# Patient Record
Sex: MALE | Race: OTHER | NOT HISPANIC OR LATINO
[De-identification: names, ages, dates, MRNs, and addresses within clinical notes are randomized per-mention and may not be internally consistent; named-entity substitution may affect disease eponyms.]

---

## 2017-12-01 ENCOUNTER — TRANSCRIPTION ENCOUNTER (OUTPATIENT)
Age: 42
End: 2017-12-01

## 2018-02-05 ENCOUNTER — TRANSCRIPTION ENCOUNTER (OUTPATIENT)
Age: 43
End: 2018-02-05

## 2018-02-13 ENCOUNTER — INPATIENT (INPATIENT)
Facility: HOSPITAL | Age: 43
LOS: 0 days | Discharge: HOME | DRG: 309 | End: 2018-02-14
Attending: INTERNAL MEDICINE | Admitting: INTERNAL MEDICINE
Payer: COMMERCIAL

## 2018-02-13 VITALS
RESPIRATION RATE: 15 BRPM | OXYGEN SATURATION: 100 % | SYSTOLIC BLOOD PRESSURE: 140 MMHG | HEART RATE: 180 BPM | DIASTOLIC BLOOD PRESSURE: 80 MMHG

## 2018-02-13 DIAGNOSIS — I48.91 UNSPECIFIED ATRIAL FIBRILLATION: ICD-10-CM

## 2018-02-13 LAB
ALBUMIN SERPL ELPH-MCNC: 4.4 G/DL — SIGNIFICANT CHANGE UP (ref 3.3–5)
ALP SERPL-CCNC: 62 U/L — SIGNIFICANT CHANGE UP (ref 40–120)
ALT FLD-CCNC: 38 U/L RC — SIGNIFICANT CHANGE UP (ref 10–45)
ANION GAP SERPL CALC-SCNC: 17 MMOL/L — SIGNIFICANT CHANGE UP (ref 5–17)
APTT BLD: 35.5 SEC — SIGNIFICANT CHANGE UP (ref 27.5–37.4)
AST SERPL-CCNC: 35 U/L — SIGNIFICANT CHANGE UP (ref 10–40)
BASE EXCESS BLDV CALC-SCNC: 0.5 MMOL/L — SIGNIFICANT CHANGE UP (ref -2–2)
BASOPHILS # BLD AUTO: 0.1 K/UL — SIGNIFICANT CHANGE UP (ref 0–0.2)
BASOPHILS NFR BLD AUTO: 0.7 % — SIGNIFICANT CHANGE UP (ref 0–2)
BILIRUB SERPL-MCNC: 1.3 MG/DL — HIGH (ref 0.2–1.2)
BUN SERPL-MCNC: 16 MG/DL — SIGNIFICANT CHANGE UP (ref 7–23)
CA-I SERPL-SCNC: 1.19 MMOL/L — SIGNIFICANT CHANGE UP (ref 1.12–1.3)
CALCIUM SERPL-MCNC: 9.2 MG/DL — SIGNIFICANT CHANGE UP (ref 8.4–10.5)
CHLORIDE BLDV-SCNC: 104 MMOL/L — SIGNIFICANT CHANGE UP (ref 96–108)
CHLORIDE SERPL-SCNC: 101 MMOL/L — SIGNIFICANT CHANGE UP (ref 96–108)
CO2 BLDV-SCNC: 28 MMOL/L — SIGNIFICANT CHANGE UP (ref 22–30)
CO2 SERPL-SCNC: 22 MMOL/L — SIGNIFICANT CHANGE UP (ref 22–31)
CREAT SERPL-MCNC: 1.12 MG/DL — SIGNIFICANT CHANGE UP (ref 0.5–1.3)
EOSINOPHIL # BLD AUTO: 1.1 K/UL — HIGH (ref 0–0.5)
EOSINOPHIL NFR BLD AUTO: 9 % — HIGH (ref 0–6)
GAS PNL BLDV: 138 MMOL/L — SIGNIFICANT CHANGE UP (ref 136–145)
GAS PNL BLDV: SIGNIFICANT CHANGE UP
GAS PNL BLDV: SIGNIFICANT CHANGE UP
GLUCOSE BLDV-MCNC: 112 MG/DL — HIGH (ref 70–99)
GLUCOSE SERPL-MCNC: 115 MG/DL — HIGH (ref 70–99)
HCO3 BLDV-SCNC: 26 MMOL/L — SIGNIFICANT CHANGE UP (ref 21–29)
HCT VFR BLD CALC: 45.9 % — SIGNIFICANT CHANGE UP (ref 39–50)
HCT VFR BLDA CALC: 48 % — SIGNIFICANT CHANGE UP (ref 39–50)
HGB BLD CALC-MCNC: 15.6 G/DL — SIGNIFICANT CHANGE UP (ref 13–17)
HGB BLD-MCNC: 15.5 G/DL — SIGNIFICANT CHANGE UP (ref 13–17)
INR BLD: 1.05 RATIO — SIGNIFICANT CHANGE UP (ref 0.88–1.16)
LACTATE BLDV-MCNC: 4.4 MMOL/L — CRITICAL HIGH (ref 0.7–2)
LYMPHOCYTES # BLD AUTO: 26.7 % — SIGNIFICANT CHANGE UP (ref 13–44)
LYMPHOCYTES # BLD AUTO: 3.4 K/UL — HIGH (ref 1–3.3)
MAGNESIUM SERPL-MCNC: 2.2 MG/DL — SIGNIFICANT CHANGE UP (ref 1.6–2.6)
MCHC RBC-ENTMCNC: 31.1 PG — SIGNIFICANT CHANGE UP (ref 27–34)
MCHC RBC-ENTMCNC: 33.9 GM/DL — SIGNIFICANT CHANGE UP (ref 32–36)
MCV RBC AUTO: 91.9 FL — SIGNIFICANT CHANGE UP (ref 80–100)
MONOCYTES # BLD AUTO: 1.2 K/UL — HIGH (ref 0–0.9)
MONOCYTES NFR BLD AUTO: 9.8 % — SIGNIFICANT CHANGE UP (ref 2–14)
NEUTROPHILS # BLD AUTO: 6.8 K/UL — SIGNIFICANT CHANGE UP (ref 1.8–7.4)
NEUTROPHILS NFR BLD AUTO: 53.8 % — SIGNIFICANT CHANGE UP (ref 43–77)
PCO2 BLDV: 49 MMHG — SIGNIFICANT CHANGE UP (ref 35–50)
PH BLDV: 7.35 — SIGNIFICANT CHANGE UP (ref 7.35–7.45)
PHOSPHATE SERPL-MCNC: 2.8 MG/DL — SIGNIFICANT CHANGE UP (ref 2.5–4.5)
PLATELET # BLD AUTO: 197 K/UL — SIGNIFICANT CHANGE UP (ref 150–400)
PO2 BLDV: 22 MMHG — LOW (ref 25–45)
POTASSIUM BLDV-SCNC: 3.1 MMOL/L — LOW (ref 3.5–5)
POTASSIUM SERPL-MCNC: 3.5 MMOL/L — SIGNIFICANT CHANGE UP (ref 3.5–5.3)
POTASSIUM SERPL-SCNC: 3.5 MMOL/L — SIGNIFICANT CHANGE UP (ref 3.5–5.3)
PROT SERPL-MCNC: 7.7 G/DL — SIGNIFICANT CHANGE UP (ref 6–8.3)
PROTHROM AB SERPL-ACNC: 11.3 SEC — SIGNIFICANT CHANGE UP (ref 9.8–12.7)
RBC # BLD: 4.99 M/UL — SIGNIFICANT CHANGE UP (ref 4.2–5.8)
RBC # FLD: 12.3 % — SIGNIFICANT CHANGE UP (ref 10.3–14.5)
SAO2 % BLDV: 29 % — LOW (ref 67–88)
SODIUM SERPL-SCNC: 140 MMOL/L — SIGNIFICANT CHANGE UP (ref 135–145)
TROPONIN T SERPL-MCNC: <0.01 NG/ML — SIGNIFICANT CHANGE UP (ref 0–0.06)
TSH SERPL-MCNC: 2.35 UIU/ML — SIGNIFICANT CHANGE UP (ref 0.27–4.2)
WBC # BLD: 12.6 K/UL — HIGH (ref 3.8–10.5)
WBC # FLD AUTO: 12.6 K/UL — HIGH (ref 3.8–10.5)

## 2018-02-13 PROCEDURE — 93010 ELECTROCARDIOGRAM REPORT: CPT

## 2018-02-13 PROCEDURE — 99285 EMERGENCY DEPT VISIT HI MDM: CPT | Mod: 25

## 2018-02-13 PROCEDURE — 71045 X-RAY EXAM CHEST 1 VIEW: CPT | Mod: 26

## 2018-02-13 RX ORDER — SODIUM CHLORIDE 9 MG/ML
1000 INJECTION INTRAMUSCULAR; INTRAVENOUS; SUBCUTANEOUS ONCE
Qty: 0 | Refills: 0 | Status: COMPLETED | OUTPATIENT
Start: 2018-02-13 | End: 2018-02-13

## 2018-02-13 RX ADMIN — SODIUM CHLORIDE 4000 MILLILITER(S): 9 INJECTION INTRAMUSCULAR; INTRAVENOUS; SUBCUTANEOUS at 20:32

## 2018-02-13 RX ADMIN — SODIUM CHLORIDE 4000 MILLILITER(S): 9 INJECTION INTRAMUSCULAR; INTRAVENOUS; SUBCUTANEOUS at 19:51

## 2018-02-13 NOTE — ED ADULT NURSE NOTE - CHPI ED SYMPTOMS NEG
no fever/no back pain/no nausea/no syncope/no diaphoresis/no vomiting/no chills/no chest pain/no cough/no dizziness/no shortness of breath

## 2018-02-13 NOTE — ED ADULT NURSE NOTE - OBJECTIVE STATEMENT
42 yo BIBEMS for Rapid Afibb PMH of Asthma. While walking experienced palpitations. Called 911.  Upon arrival HR noted to be 140's by EMS. Bethanie SOB dizziness or CP. + weakness. As per EMS on route   pt HR up to 170-180. Pt given Cardizem 25 mg. EKG done upon arrival. 60 Cardizem po given as ordered.   Pt placed on monitor. Labs drawn and sent. 2 Large bore IV's placed. Denies taking any substances or drugs. MD at bedside. 42 yo BIBEMS for Rapid AFlutter PMH of Asthma. While driving experienced palpitations. Called 911.  Upon arrival HR noted to be 140's by EMS. Bethanie SOB weakness or CP. + dizziness. As per EMS on route   pt HR up to 170-180. Pt given Cardizem 25 mg IVP. EKG done upon arrival. 60 Cardizem po given as ordered. Pt placed on monitor. Labs drawn and sent. 2 Large bore IV's placed. Denies taking any substances or drugs. MD at bedside.

## 2018-02-13 NOTE — ED PROVIDER NOTE - PHYSICAL EXAMINATION
Gen: NAD, anxious, AOx3  Head: NCAT  HEENT: PERRL, oral mucosa moist, normal conjunctiva, neck supple  Lung: CTAB, no respiratory distress  CV: tachy irregular, no murmur, Normal perfusion  Abd: soft, NTND  MSK: No edema, no visible deformities  Neuro: No focal neurologic deficits  Skin: No rash   Psych: normal affect

## 2018-02-13 NOTE — ED PROVIDER NOTE - OBJECTIVE STATEMENT
42yo M with sudden onset palpitations while driving, has had transient episode that lasted a few seconds, never seen cards/PCP. felt dizzy at time. en route with EMS hr went from 140-180, BP remained stable. given 25mg IV cardizem, hr improved. feeling slightly dizzy now. no CP/SOB. no recent illness. daughter dx with flu recently. no vomiting/diarrhea. no h/o DVT/PE, no recent immobilization. dad had brugada syndrome. no h/o thyriod issues/no weight loss.     PCP- [facility]

## 2018-02-13 NOTE — ED PROVIDER NOTE - ATTENDING CONTRIBUTION TO CARE
43M no pmhx presents to the ED for dizziness. Pt was driving in his car when he felt dizzy and palpitations. Called EMS - found to be in afib/flutter rvr. EMS gave 25mg cardizem IV and brought pt to ED. on arrival pt aaox3 well appearing. no cp sob abd pain nausea or vomiting. no recent travel or surgeries. no leg swelling. no drug use. brother with brugada but no fh of sudden cardiac death. Clear lungs afib rvr abd soft non-tender no LE edema. Will obtain labs xray ekg po cardizem ddimer and admit for further workup.     Constitutional: No fever or chills  Eyes: No visual changes  HEENT: No throat pain  CV: No chest pain + palpitations  Resp: No SOB no cough  GI: No abd pain, nausea or vomiting  : No dysuria  MSK: No musculoskeletal pain  Skin: No rash  Neuro: No headache + dizziness    Constitutional: mild distress AAOx3  Eyes: PERRLA EOMI  Head: Normocephalic atraumatic  Mouth: MMM  Cardiac: afib rvr   Resp: Lungs CTAB  GI: Abd s/nt/nd  Neuro: CN2-12 intact  Skin: No rashes   Obdulio Avelar M.D., Attending Physician

## 2018-02-13 NOTE — ED PROVIDER NOTE - MEDICAL DECISION MAKING DETAILS
43M no pmhx presents to the ED for dizziness. Pt was driving in his car when he felt dizzy and palpitations. Called EMS - found to be in afib/flutter rvr. EMS gave 25mg cardizem IV and brought pt to ED. on arrival pt aaox3 well appearing. no cp sob abd pain nausea or vomiting. no recent travel or surgeries. no leg swelling. no drug use. brother with brugada but no fh of sudden cardiac death. Clear lungs afib rvr abd soft non-tender no LE edema. Will obtain labs xray ekg po cardizem ddimer and admit for further workup. Obdulio Avelar M.D., Attending Physician

## 2018-02-14 ENCOUNTER — TRANSCRIPTION ENCOUNTER (OUTPATIENT)
Age: 43
End: 2018-02-14

## 2018-02-14 VITALS
OXYGEN SATURATION: 97 % | RESPIRATION RATE: 18 BRPM | HEART RATE: 85 BPM | DIASTOLIC BLOOD PRESSURE: 70 MMHG | TEMPERATURE: 98 F | SYSTOLIC BLOOD PRESSURE: 106 MMHG

## 2018-02-14 DIAGNOSIS — I48.0 PAROXYSMAL ATRIAL FIBRILLATION: ICD-10-CM

## 2018-02-14 DIAGNOSIS — E87.2 ACIDOSIS: ICD-10-CM

## 2018-02-14 DIAGNOSIS — I48.91 UNSPECIFIED ATRIAL FIBRILLATION: ICD-10-CM

## 2018-02-14 DIAGNOSIS — J45.909 UNSPECIFIED ASTHMA, UNCOMPLICATED: ICD-10-CM

## 2018-02-14 LAB — RAPID RVP RESULT: SIGNIFICANT CHANGE UP

## 2018-02-14 PROCEDURE — 87581 M.PNEUMON DNA AMP PROBE: CPT

## 2018-02-14 PROCEDURE — 82803 BLOOD GASES ANY COMBINATION: CPT

## 2018-02-14 PROCEDURE — 85027 COMPLETE CBC AUTOMATED: CPT

## 2018-02-14 PROCEDURE — 82435 ASSAY OF BLOOD CHLORIDE: CPT

## 2018-02-14 PROCEDURE — 84295 ASSAY OF SERUM SODIUM: CPT

## 2018-02-14 PROCEDURE — 84443 ASSAY THYROID STIM HORMONE: CPT

## 2018-02-14 PROCEDURE — 87633 RESP VIRUS 12-25 TARGETS: CPT

## 2018-02-14 PROCEDURE — 85014 HEMATOCRIT: CPT

## 2018-02-14 PROCEDURE — 93005 ELECTROCARDIOGRAM TRACING: CPT

## 2018-02-14 PROCEDURE — 85610 PROTHROMBIN TIME: CPT

## 2018-02-14 PROCEDURE — 82330 ASSAY OF CALCIUM: CPT

## 2018-02-14 PROCEDURE — 87798 DETECT AGENT NOS DNA AMP: CPT

## 2018-02-14 PROCEDURE — 93306 TTE W/DOPPLER COMPLETE: CPT

## 2018-02-14 PROCEDURE — 93010 ELECTROCARDIOGRAM REPORT: CPT

## 2018-02-14 PROCEDURE — 85379 FIBRIN DEGRADATION QUANT: CPT

## 2018-02-14 PROCEDURE — 93306 TTE W/DOPPLER COMPLETE: CPT | Mod: 26

## 2018-02-14 PROCEDURE — 99285 EMERGENCY DEPT VISIT HI MDM: CPT | Mod: 25

## 2018-02-14 PROCEDURE — 87486 CHLMYD PNEUM DNA AMP PROBE: CPT

## 2018-02-14 PROCEDURE — 99223 1ST HOSP IP/OBS HIGH 75: CPT

## 2018-02-14 PROCEDURE — 84100 ASSAY OF PHOSPHORUS: CPT

## 2018-02-14 PROCEDURE — 71045 X-RAY EXAM CHEST 1 VIEW: CPT

## 2018-02-14 PROCEDURE — 83735 ASSAY OF MAGNESIUM: CPT

## 2018-02-14 PROCEDURE — 84484 ASSAY OF TROPONIN QUANT: CPT

## 2018-02-14 PROCEDURE — 80053 COMPREHEN METABOLIC PANEL: CPT

## 2018-02-14 PROCEDURE — 82565 ASSAY OF CREATININE: CPT

## 2018-02-14 PROCEDURE — 82947 ASSAY GLUCOSE BLOOD QUANT: CPT

## 2018-02-14 PROCEDURE — 83605 ASSAY OF LACTIC ACID: CPT

## 2018-02-14 PROCEDURE — 84132 ASSAY OF SERUM POTASSIUM: CPT

## 2018-02-14 PROCEDURE — 85730 THROMBOPLASTIN TIME PARTIAL: CPT

## 2018-02-14 RX ORDER — MONTELUKAST 4 MG/1
1 TABLET, CHEWABLE ORAL
Qty: 0 | Refills: 0 | COMMUNITY

## 2018-02-14 RX ORDER — LEVALBUTEROL 1.25 MG/.5ML
0.63 SOLUTION, CONCENTRATE RESPIRATORY (INHALATION)
Qty: 0 | Refills: 0 | Status: DISCONTINUED | OUTPATIENT
Start: 2018-02-14 | End: 2018-02-14

## 2018-02-14 RX ORDER — MONTELUKAST 4 MG/1
10 TABLET, CHEWABLE ORAL AT BEDTIME
Qty: 0 | Refills: 0 | Status: DISCONTINUED | OUTPATIENT
Start: 2018-02-14 | End: 2018-02-14

## 2018-02-14 RX ORDER — ZILEUTON 600 MG/1
1 TABLET, MULTILAYER, EXTENDED RELEASE ORAL
Qty: 0 | Refills: 0 | COMMUNITY

## 2018-02-14 RX ORDER — FLUTICASONE PROPIONATE 50 MCG
1 SPRAY, SUSPENSION NASAL
Qty: 0 | Refills: 0 | Status: DISCONTINUED | OUTPATIENT
Start: 2018-02-14 | End: 2018-02-14

## 2018-02-14 RX ORDER — DILTIAZEM HCL 120 MG
120 CAPSULE, EXT RELEASE 24 HR ORAL DAILY
Qty: 0 | Refills: 0 | Status: DISCONTINUED | OUTPATIENT
Start: 2018-02-14 | End: 2018-02-14

## 2018-02-14 RX ORDER — HEPARIN SODIUM 5000 [USP'U]/ML
5000 INJECTION INTRAVENOUS; SUBCUTANEOUS EVERY 8 HOURS
Qty: 0 | Refills: 0 | Status: DISCONTINUED | OUTPATIENT
Start: 2018-02-14 | End: 2018-02-14

## 2018-02-14 RX ORDER — DILTIAZEM HCL 120 MG
1 CAPSULE, EXT RELEASE 24 HR ORAL
Qty: 30 | Refills: 0 | OUTPATIENT
Start: 2018-02-14 | End: 2018-03-15

## 2018-02-14 RX ORDER — LEVALBUTEROL 1.25 MG/.5ML
2 SOLUTION, CONCENTRATE RESPIRATORY (INHALATION) EVERY 4 HOURS
Qty: 0 | Refills: 0 | Status: DISCONTINUED | OUTPATIENT
Start: 2018-02-14 | End: 2018-02-14

## 2018-02-14 RX ORDER — LEVALBUTEROL 1.25 MG/.5ML
2 SOLUTION, CONCENTRATE RESPIRATORY (INHALATION)
Qty: 0 | Refills: 0 | COMMUNITY

## 2018-02-14 RX ADMIN — Medication 120 MILLIGRAM(S): at 13:03

## 2018-02-14 NOTE — H&P ADULT - HISTORY OF PRESENT ILLNESS
[address] [facility]  [phone]               Thank you for choosing us for your health care visit with ABDIRAHMAN Oakes.   We are glad to serve you and happy to provide you with this summa Tylenol suspension   Childrens Chewable   Jr.  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 Patient reports that he was at home when he suddenly felt his heart begin to race and he began to have some vague chest discomfort. Patient reports that several years ago he had similar episodes when he was stressed out by work or other personal issues but that these would resolve within 30 minutes to an hour. He has not had a similar episode in several years, however recently he has been undergoing a divorce and has been stressed about his parents' health. He also does report a sick contact as his daughter has had the flu (tested) and has been at home coughing and having nausea/vomiting. Patient denies any fevers or chills, denies myalgias, denies cough or nausea but does report that over the weekend before the palpitations he did feel very fatigued and weak and had been worried he might have caught the flu. illnesses resolve within 7 to 14 days with rest and simple home remedies. However, they may sometimes last up to 4 weeks. Antibiotics will not kill a virus and are generally not prescribed for this condition.     Home care  · Fluids: Fever increases water l · Nasal congestion: Suction the nose of infants with a bulb syringe. You may put 2 to 3 drops of saltwater (saline) nose drops in each nostril before suctioning. This helps thin and remove secretions. Saline nose drops are available without a prescription. · Your child is dehydrated, with one or more of these symptoms:  ¨ No tears when crying. ¨ “Sunken” eyes or a dry mouth. ¨ No wet diapers for 8 hours in infants. ¨ Reduced urine output in older children.   Call 911, or get immediate medical care  Contact

## 2018-02-14 NOTE — DISCHARGE NOTE ADULT - PLAN OF CARE
Atrial fibrillation is the most common heart rhythm problem & has the risk of stroke & heart attack  It helps if you control your blood pressure, not drink more than 1-2 alcohol drinks per day, cut down on caffeine, getting treatment for over active thyroid gland, & getting exercise  Call your doctor if you feel your heart racing or beating unusually, chest tightness or pain, lightheaded, faint, shortness of breath especially with exercise  It is important to take your heart medication as prescribed  You are not on anticoagulation since paroxysmal and low risk for stroke  follow up with cardiology within 2 weeks  take Cardizem  mg daily as directed  you will need outpatient Halter monitor No aspirin optimal medical management

## 2018-02-14 NOTE — H&P ADULT - ASSESSMENT
44yo M with pmhx of Samter's triad/Asthma/Aspirin allergy/Nasal polyps, episodes of palpitations that had resolved within minutes to hours in the past, who now presents with persistent palpitations and fatigue found to have afib w/rvr and lactic acidosis

## 2018-02-14 NOTE — H&P ADULT - PROBLEM SELECTOR PLAN 1
Will rate control with cardizem for now given samter's triad to avoid possible exacerbation of asthma.  Cardiology consult in AM  Check tte  If still in Afib may need AC and possible cardioversion

## 2018-02-14 NOTE — CONSULT NOTE ADULT - ASSESSMENT
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

## 2018-02-14 NOTE — H&P ADULT - NSHPLABSRESULTS_GEN_ALL_CORE
Labs personally reviewed:                        15.5   12.6  )-----------( 197      ( 13 Feb 2018 20:07 )             45.9       02-13    140  |  101  |  16  ----------------------------<  115<H>  3.5   |  22  |  1.12    Ca    9.2      13 Feb 2018 20:07  Phos  2.8     02-13  Mg     2.2     02-13    TPro  7.7  /  Alb  4.4  /  TBili  1.3<H>  /  DBili  x   /  AST  35  /  ALT  38  /  AlkPhos  62  02-13      CARDIAC MARKERS ( 13 Feb 2018 20:07 )  x     / <0.01 ng/mL / x     / x     / x            LIVER FUNCTIONS - ( 13 Feb 2018 20:07 )  Alb: 4.4 g/dL / Pro: 7.7 g/dL / ALK PHOS: 62 U/L / ALT: 38 U/L RC / AST: 35 U/L / GGT: x             PT/INR - ( 13 Feb 2018 20:07 )   PT: 11.3 sec;   INR: 1.05 ratio         PTT - ( 13 Feb 2018 20:07 )  PTT:35.5 sec  CXR personally reviewed-clear lungs  EKG personally reviewed-Afib w/rvr 112-114bpm

## 2018-02-14 NOTE — H&P ADULT - PROBLEM SELECTOR PLAN 2
Unclear if related to afib w/rvr with decreased perfusion vs possible influenza given exposure.  Check RVP  Consider tamiflu ppx given known exposure-if RVP negative should start on ppx dose, if positive for flu start on treatment dose.

## 2018-02-14 NOTE — CHART NOTE - NSCHARTNOTEFT_GEN_A_CORE
Pt seen and examined at bedside. Please refer to the H&P done today for details.    Tele with NSR since last night.  Pt feels well.   No chest pain, no shortness of breath.   No overnight event. No palpitation.   No N/V/D. No abdominal pain.  +anxiety due to recent divorce and custody challenges.   Overwhelm with full time job.   TSH WNL.   Pt seen by card. consult appreciated.  Started on low dose Cardizem 120 mg CD qdaily.  No AC given low CHAVASs score zero and he is back in sinus.    If TTE is normal, d/c planning with outpt cardiology follow up.     [facility]  - [phone]

## 2018-02-14 NOTE — CONSULT NOTE ADULT - SUBJECTIVE AND OBJECTIVE BOX
[facility] Cardiology Consult  _________________________    CC: palpitations, fatigue.      HPI:  43 h/o Shady's triad -Asthma/ASA Allergy/Nasal polyps, prior episodes of palpitations who was admitted with persistent palpitations and fatigue. He was at rest when he suddenly felt his heart start to race with associated mild chest discomfort. No dyspnea. No LE edema, orthopnea or PND. No presyncope or syncope. His daughter recently had the flu. The patient denies any fevers or chills, muscle aches, sore throat or cough. + fatigue. In the ED he was found to be in Afib.  He converted to sinus by the time he reached the floor. He is currently asymptomatic.      PAST MEDICAL & SURGICAL HISTORY:  Samter's triad  No significant past surgical history      MEDICATIONS  (STANDING):  diltiazem    Tablet 60 milliGRAM(s) Oral every 6 hours  heparin  Injectable 5000 Unit(s) SubCutaneous every 8 hours  montelukast 10 milliGRAM(s) Oral at bedtime  Zyflo  milliGRAM(s) 600 milliGRAM(s) Oral two times a day    MEDICATIONS  (PRN):  levalbuterol 45 MICROgram(s) HFA Inhaler 2 Puff(s) Inhalation every 4 hours PRN shortness of breathe      Allergies    aspirin (Short breath; Anaphylaxis)    Intolerances        Social Histroy: Tobacco- , ETOH-, Illicit Drugs-    T(C): 36.8 (02-14-18 @ 05:05), Max: 37.2 (02-13-18 @ 23:55)  HR: 86 (02-14-18 @ 09:01) (79 - 180)  BP: 109/68 (02-14-18 @ 09:01) (103/62 - 140/80)  RR: 16 (02-14-18 @ 05:05) (15 - 18)  SpO2: 96% (02-14-18 @ 05:05) (96% - 100%)  I&O's Summary    13 Feb 2018 07:01  -  14 Feb 2018 07:00  --------------------------------------------------------  IN: 50 mL / OUT: 250 mL / NET: -200 mL    14 Feb 2018 07:01  -  14 Feb 2018 09:12  --------------------------------------------------------  IN: 480 mL / OUT: 700 mL / NET: -220 mL        Review of Systems:  Constitutional: [ ] Fever [ ] Chills [x ] Fatigue [ ] Weight change   HEENT: [ ] Blurred vision [ ] Eye Pain [ ] Headache [ ] Runny nose [ ] Sore Throat   Respiratory: [ ] Cough [ ] Wheezing [ ] Shortness of breath  Cardiovascular: [ ] Chest Pain [x ] Palpitations [ ] WEST [ ] PND [ ] Orthopnea  Gastrointestinal: [ ] Abdominal Pain [ ] Diarrhea [ ] Constipation [ ] Hemorrhoids [ ] Nausea [ ] Vomiting  Genitourinary: [ ] Nocturia [ ] Dysuria [ ] Incontinence  Extremities: [ ] Swelling [ ] Joint Pain  Neurologic: [ ] Focal deficit [ ] Paresthesias [ ] Syncope  Lymphatic: [ ] Swelling [ ] Lymphadenopathy   Skin: [ ] Rash [ ] Ecchymoses [ ] Wounds [ ] Lesions  Psychiatry: [ ] Depression [ ] Suicidal/Homicidal Ideation [ ] Anxiety [ ] Sleep Disturbances  [x ] 10 point review of systems is otherwise negative except as mentioned above            [ ]Unable to obtain    PHYSICAL EXAM:  GENERAL: Alert, NAD  NECK: Supple, No JVD, No carotid bruit.  CHEST/LUNG: Clear to auscultation bilaterally; No wheezes, rales, or rhonchi  HEART: S1 S2 normal, RRR,  No murmurs, rubs, or gallops  ABDOMEN: Soft, Nontender, Nondistended; Bowel sounds present  EXTREMITIES:  No LE edema.      LABS:                        15.5   12.6  )-----------( 197      ( 13 Feb 2018 20:07 )             45.9     02-13    140  |  101  |  16  ----------------------------<  115<H>  3.5   |  22  |  1.12    Ca    9.2      13 Feb 2018 20:07  Phos  2.8     02-13  Mg     2.2     02-13    TPro  7.7  /  Alb  4.4  /  TBili  1.3<H>  /  DBili  x   /  AST  35  /  ALT  38  /  AlkPhos  62  02-13    PT/INR - ( 13 Feb 2018 20:07 )   PT: 11.3 sec;   INR: 1.05 ratio         PTT - ( 13 Feb 2018 20:07 )  PTT:35.5 sec  CARDIAC MARKERS ( 13 Feb 2018 20:07 )  x     / <0.01 ng/mL / x     / x     / x            MEDICATIONS  (STANDING):  diltiazem    Tablet 60 milliGRAM(s) Oral every 6 hours  heparin  Injectable 5000 Unit(s) SubCutaneous every 8 hours  montelukast 10 milliGRAM(s) Oral at bedtime  Zyflo  milliGRAM(s) 600 milliGRAM(s) Oral two times a day    MEDICATIONS  (PRN):  levalbuterol 45 MICROgram(s) HFA Inhaler 2 Puff(s) Inhalation every 4 hours PRN shortness of breathe      RADIOLOGY & ADDITIONAL TESTS:    Cardiology testing:  EKG: Afib at 112 BPM, incomplete RBBB, RAD.    Telemetry: currently sinus rhythm 60-90's, no afib on the floors. [facility] Cardiology Consult  _________________________    CC: palpitations, fatigue.      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 He was at rest when he suddenly felt his heart start to race with associated mild chest discomfort. No dyspnea. No LE edema, orthopnea or PND. No presyncope or syncope. His daughter recently had the flu. The patient denies any fevers or chills, muscle aches, sore throat or cough. + fatigue. In the ED he was found to be in Afib.  He converted to sinus by the time he reached the floor. He is currently asymptomatic.      PAST MEDICAL & SURGICAL HISTORY:  Samter's triad  No significant past surgical history  RBBB      MEDICATIONS  (STANDING):  diltiazem    Tablet 60 milliGRAM(s) Oral every 6 hours  heparin  Injectable 5000 Unit(s) SubCutaneous every 8 hours  montelukast 10 milliGRAM(s) Oral at bedtime  Zyflo  milliGRAM(s) 600 milliGRAM(s) Oral two times a day    MEDICATIONS  (PRN):  levalbuterol 45 MICROgram(s) HFA Inhaler 2 Puff(s) Inhalation every 4 hours PRN shortness of breathe      Allergies    aspirin (Short breath; Anaphylaxis)    Intolerances        Social Histroy: Tobacco- , ETOH-, Illicit Drugs-    T(C): 36.8 (02-14-18 @ 05:05), Max: 37.2 (02-13-18 @ 23:55)  HR: 86 (02-14-18 @ 09:01) (79 - 180)  BP: 109/68 (02-14-18 @ 09:01) (103/62 - 140/80)  RR: 16 (02-14-18 @ 05:05) (15 - 18)  SpO2: 96% (02-14-18 @ 05:05) (96% - 100%)  I&O's Summary    13 Feb 2018 07:01  -  14 Feb 2018 07:00  --------------------------------------------------------  IN: 50 mL / OUT: 250 mL / NET: -200 mL    14 Feb 2018 07:01  -  14 Feb 2018 09:12  --------------------------------------------------------  IN: 480 mL / OUT: 700 mL / NET: -220 mL        Review of Systems:  Constitutional: [ ] Fever [ ] Chills [x ] Fatigue [ ] Weight change   HEENT: [ ] Blurred vision [ ] Eye Pain [ ] Headache [ ] Runny nose [ ] Sore Throat   Respiratory: [ ] Cough [ ] Wheezing [ ] Shortness of breath  Cardiovascular: [ ] Chest Pain [x ] Palpitations [ ] WEST [ ] PND [ ] Orthopnea  Gastrointestinal: [ ] Abdominal Pain [ ] Diarrhea [ ] Constipation [ ] Hemorrhoids [ ] Nausea [ ] Vomiting  Genitourinary: [ ] Nocturia [ ] Dysuria [ ] Incontinence  Extremities: [ ] Swelling [ ] Joint Pain  Neurologic: [ ] Focal deficit [ ] Paresthesias [ ] Syncope  Lymphatic: [ ] Swelling [ ] Lymphadenopathy   Skin: [ ] Rash [ ] Ecchymoses [ ] Wounds [ ] Lesions  Psychiatry: [ ] Depression [ ] Suicidal/Homicidal Ideation [ ] Anxiety [ ] Sleep Disturbances  [x ] 10 point review of systems is otherwise negative except as mentioned above            [ ]Unable to obtain    PHYSICAL EXAM:  GENERAL: Alert, NAD  NECK: Supple, No JVD, No carotid bruit.  CHEST/LUNG: Clear to auscultation bilaterally; No wheezes, rales, or rhonchi  HEART: S1 normal, S2 paradoxically split, RRR, no m/r/g.   ABDOMEN: Soft, Nontender, Nondistended; Bowel sounds present  EXTREMITIES:  No LE edema.      LABS:                        15.5   12.6  )-----------( 197      ( 13 Feb 2018 20:07 )             45.9     02-13    140  |  101  |  16  ----------------------------<  115<H>  3.5   |  22  |  1.12    Ca    9.2      13 Feb 2018 20:07  Phos  2.8     02-13  Mg     2.2     02-13    TPro  7.7  /  Alb  4.4  /  TBili  1.3<H>  /  DBili  x   /  AST  35  /  ALT  38  /  AlkPhos  62  02-13    PT/INR - ( 13 Feb 2018 20:07 )   PT: 11.3 sec;   INR: 1.05 ratio         PTT - ( 13 Feb 2018 20:07 )  PTT:35.5 sec  CARDIAC MARKERS ( 13 Feb 2018 20:07 )  x     / <0.01 ng/mL / x     / x     / x            MEDICATIONS  (STANDING):  diltiazem    Tablet 60 milliGRAM(s) Oral every 6 hours  heparin  Injectable 5000 Unit(s) SubCutaneous every 8 hours  montelukast 10 milliGRAM(s) Oral at bedtime  Zyflo  milliGRAM(s) 600 milliGRAM(s) Oral two times a day    MEDICATIONS  (PRN):  levalbuterol 45 MICROgram(s) HFA Inhaler 2 Puff(s) Inhalation every 4 hours PRN shortness of breathe      RADIOLOGY & ADDITIONAL TESTS:    Cardiology testing:  EKG: Afib at 112 BPM, incomplete RBBB, RAD.    Telemetry: currently sinus rhythm 60-90's, no afib on the floors.

## 2018-02-14 NOTE — DISCHARGE NOTE ADULT - CARE PROVIDER_API CALL
Mono Wheat), Cardiovascular Disease; Internal Medicine  [address] [place] [address]  Phone: [phone]  Fax: [phone]

## 2018-02-14 NOTE — DISCHARGE NOTE ADULT - REASON FOR ADMISSION
Palpitations/fatigue - diagnosed with new onset atrial fibrillation & converted to normal sinus rhythm in ER - no anticoagulation since PRINCESS score 0

## 2018-02-14 NOTE — DISCHARGE NOTE ADULT - HOSPITAL COURSE
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       Problem/Recommendation - 1:  Problem: Paroxysmal atrial fibrillation. Recommendation: -  - Pt is currently in sinus rhythm and asymptomatic.  - change rate control to cardizem  mg po daily.  - TSH was normal.  - His CHADSVasc Score is 0  - which puts him at stroke risk of approx 0.2% per year.  - Discussed risks/benefits/alternatives of AC with a NOAC, including bleeding risks and patient expressed understanding. Patient would like to hold off on AC for now.  - Check TTE. If can't be done today will get echo when I see him in the office. - normal results on echocardiogram  - will perform further monitoring (holter/event) as outpatient. If recurrence of AF will discuss AC once again. 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       Problem/Recommendation - 1:  Problem: Paroxysmal atrial fibrillation. Recommendation: -  - Pt is currently in sinus rhythm and asymptomatic.  - change rate control to cardizem  mg po daily.	  - TSH was normal.  - His CHADSVasc Score is 0  - which puts him at stroke risk of approx 0.2% per year.  - Discussed risks/benefits/alternatives of AC with a NOAC, including bleeding risks and patient expressed understanding. Patient would like to hold off on AC for now.  - TTE with normal LV function. no vulvular disease.   - will perform further monitoring (holter/event) as outpatient. If recurrence of AF will discuss AC once again.

## 2018-02-14 NOTE — H&P ADULT - NSHPPHYSICALEXAM_GEN_ALL_CORE
Vital Signs Last 24 Hrs  T(C): 37.2 (13 Feb 2018 23:55), Max: 37.2 (13 Feb 2018 23:55)  T(F): 99 (13 Feb 2018 23:55), Max: 99 (13 Feb 2018 23:55)  HR: 84 (13 Feb 2018 23:55) (84 - 180)  BP: 103/62 (13 Feb 2018 23:55) (103/62 - 140/80)  BP(mean): --  RR: 17 (13 Feb 2018 23:55) (15 - 18)  SpO2: 96% (13 Feb 2018 23:55) (96% - 100%)    GENERAL: No acute distress, well-developed  HEAD:  Atraumatic, Normocephalic  EYES: EOMI, PERRLA, conjunctiva and sclera clear  ENT: Oral mucosa moist  NECK: Neck supple  CHEST/LUNG: Clear to auscultation bilaterally; No wheeze, no rales, no rhonchi.    HEART: IRR, tachycardic, no murmurs  ABDOMEN: Soft, Nontender, Nondistended; Bowel sounds present  EXTREMITIES:  No clubbing, cyanosis, or edema  VASCULAR: Posterior tibialis pulses intact bilaterally  PSYCH: Normal behavior, normal affect  NEUROLOGY: AAOx3  SKIN: grossly warm and dry

## 2018-02-14 NOTE — H&P ADULT - PROBLEM SELECTOR PLAN 3
Pt's family will bring his zyflo as pharmacy does not have in stock and cannot obtain until thursday.   Continue singulair  Can have duonebs PRN if needed but no active asthma exacerbation  avoid aspirin and NSAIDs.

## 2018-02-14 NOTE — DISCHARGE NOTE ADULT - ADDITIONAL INSTRUCTIONS
follow up with primary care physician within one week after discharge  follow up with cardiology Dr. Wheat within 2 weeks after discharge

## 2018-02-14 NOTE — DISCHARGE NOTE ADULT - MEDICATION SUMMARY - MEDICATIONS TO TAKE
I will START or STAY ON the medications listed below when I get home from the hospital:    dilTIAZem 120 mg/24 hours oral capsule, extended release  -- 1 cap(s) by mouth once a day  -- Indication: For Atrial fibrillation    Xopenex HFA 45 mcg/inh inhalation aerosol  -- 2 puff(s) inhaled every 4 hours, As Needed - for shortness of breath and/or wheezing  -- Indication: For Asthma    Zyflo  mg oral tablet, extended release  -- 1 tab(s) by mouth 2 times a day  -- Indication: For Asthma    Singulair 10 mg oral tablet  -- 1 tab(s) by mouth once a day (at bedtime)  -- Indication: For Asthma

## 2018-02-14 NOTE — CONSULT NOTE ADULT - PROBLEM SELECTOR RECOMMENDATION 9
-  - Pt is currently in sinus rhythm. -  - Pt is currently in sinus rhythm and asymptomatic.  - change rate control to cardizem  mg po daily.  - TSH was normal.  - His CHADSVasc Score is 0  - which puts him at stroke risk of approx 0.2% per year.  - Discussed risks/benefits/alternatives of AC with a NOAC, including bleeding risks and patient expressed understanding. Patient would like to hold off on AC for now.  - Check TTE. If can't be done today will get echo when I see him in the office.  - will perform further monitoring (holter/event) as outpatient. If recurrence of AF will discuss AC once again.    Plan d/w pt and his mother. Questions answered.    A total of 80 minutes of face-to-face time was spent with the patient during this encounter -over half of that time was spent in counseling and coordination of care.

## 2018-02-14 NOTE — DISCHARGE NOTE ADULT - PATIENT PORTAL LINK FT
You can access the [facility] Patient Portal, offered by [facility], by registering with the following website: [email or web address]

## 2020-06-23 ENCOUNTER — TRANSCRIPTION ENCOUNTER (OUTPATIENT)
Age: 45
End: 2020-06-23

## 2021-01-04 ENCOUNTER — TRANSCRIPTION ENCOUNTER (OUTPATIENT)
Age: 46
End: 2021-01-04

## 2021-01-15 NOTE — ED ADULT TRIAGE NOTE - CCCP TRG CHIEF CMPLNT
palpitations Cosentyx Counseling:  I discussed with the patient the risks of Cosentyx including but not limited to worsening of Crohn's disease, immunosuppression, allergic reactions and infections.  The patient understands that monitoring is required including a PPD at baseline and must alert us or the primary physician if symptoms of infection or other concerning signs are noted.

## 2021-03-27 ENCOUNTER — TRANSCRIPTION ENCOUNTER (OUTPATIENT)
Age: 46
End: 2021-03-27

## 2021-05-04 NOTE — DISCHARGE NOTE ADULT - CARE PLAN
Patient arrived to ICU room 5148, no complications Principal Discharge DX:	Paroxysmal atrial fibrillation  Goal:	optimal medical management  Assessment and plan of treatment:	Atrial fibrillation is the most common heart rhythm problem & has the risk of stroke & heart attack  It helps if you control your blood pressure, not drink more than 1-2 alcohol drinks per day, cut down on caffeine, getting treatment for over active thyroid gland, & getting exercise  Call your doctor if you feel your heart racing or beating unusually, chest tightness or pain, lightheaded, faint, shortness of breath especially with exercise  It is important to take your heart medication as prescribed  You are not on anticoagulation since paroxysmal and low risk for stroke  follow up with cardiology within 2 weeks  take Cardizem  mg daily as directed  you will need outpatient Halter monitor  Secondary Diagnosis:	Samter's triad  Assessment and plan of treatment:	No aspirin

## 2021-08-04 ENCOUNTER — TRANSCRIPTION ENCOUNTER (OUTPATIENT)
Age: 46
End: 2021-08-04

## 2021-11-06 NOTE — DISCHARGE NOTE ADULT - MEDICATION SUMMARY - MEDICATIONS TO STOP TAKING
You can access the FollowMyHealth Patient Portal offered by [facility] by registering at the following website: [email or web address]. By joining [facility]’s FollowMyHealth portal, you will also be able to view your health information using other applications (apps) compatible with our system. I will STOP taking the medications listed below when I get home from the hospital:  None

## 2023-01-04 PROBLEM — J45.909 UNSPECIFIED ASTHMA, UNCOMPLICATED: Chronic | Status: ACTIVE | Noted: 2018-02-14

## 2023-01-12 ENCOUNTER — APPOINTMENT (OUTPATIENT)
Dept: CV DIAGNOSITCS | Facility: HOSPITAL | Age: 48
End: 2023-01-12

## 2023-01-12 ENCOUNTER — OUTPATIENT (OUTPATIENT)
Dept: OUTPATIENT SERVICES | Facility: HOSPITAL | Age: 48
LOS: 1 days | End: 2023-01-12
Payer: COMMERCIAL

## 2023-01-12 DIAGNOSIS — R94.39 ABNORMAL RESULT OF OTHER CARDIOVASCULAR FUNCTION STUDY: ICD-10-CM

## 2023-01-12 PROCEDURE — 93320 DOPPLER ECHO COMPLETE: CPT

## 2023-01-12 PROCEDURE — 93325 DOPPLER ECHO COLOR FLOW MAPG: CPT

## 2023-01-12 PROCEDURE — 93325 DOPPLER ECHO COLOR FLOW MAPG: CPT | Mod: 26

## 2023-01-12 PROCEDURE — 93351 STRESS TTE COMPLETE: CPT | Mod: 26

## 2023-01-12 PROCEDURE — 93320 DOPPLER ECHO COMPLETE: CPT | Mod: 26

## 2023-01-12 PROCEDURE — 93351 STRESS TTE COMPLETE: CPT

## 2023-07-13 ENCOUNTER — NON-APPOINTMENT (OUTPATIENT)
Age: 48
End: 2023-07-13

## 2024-03-19 ENCOUNTER — RESULT REVIEW (OUTPATIENT)
Age: 49
End: 2024-03-19

## 2024-03-19 ENCOUNTER — APPOINTMENT (OUTPATIENT)
Dept: CV DIAGNOSITCS | Facility: HOSPITAL | Age: 49
End: 2024-03-19

## 2024-03-19 ENCOUNTER — OUTPATIENT (OUTPATIENT)
Dept: OUTPATIENT SERVICES | Facility: HOSPITAL | Age: 49
LOS: 1 days | End: 2024-03-19
Payer: COMMERCIAL

## 2024-03-19 DIAGNOSIS — I25.10 ATHEROSCLEROTIC HEART DISEASE OF NATIVE CORONARY ARTERY WITHOUT ANGINA PECTORIS: ICD-10-CM

## 2024-03-19 PROCEDURE — C8930: CPT

## 2024-03-19 PROCEDURE — 93351 STRESS TTE COMPLETE: CPT | Mod: 26

## 2024-03-19 PROCEDURE — 93017 CV STRESS TEST TRACING ONLY: CPT

## 2024-12-24 ENCOUNTER — NON-APPOINTMENT (OUTPATIENT)
Age: 49
End: 2024-12-24

## 2025-01-18 ENCOUNTER — NON-APPOINTMENT (OUTPATIENT)
Age: 50
End: 2025-01-18

## 2025-03-11 ENCOUNTER — APPOINTMENT (OUTPATIENT)
Dept: HUMAN REPRODUCTION | Facility: CLINIC | Age: 50
End: 2025-03-11
Payer: COMMERCIAL

## 2025-03-11 ENCOUNTER — TRANSCRIPTION ENCOUNTER (OUTPATIENT)
Age: 50
End: 2025-03-11

## 2025-03-11 PROCEDURE — 36415 COLL VENOUS BLD VENIPUNCTURE: CPT | Mod: NC

## 2025-04-09 ENCOUNTER — APPOINTMENT (OUTPATIENT)
Dept: HUMAN REPRODUCTION | Facility: CLINIC | Age: 50
End: 2025-04-09
Payer: COMMERCIAL

## 2025-04-09 PROCEDURE — ZZZZZ: CPT

## 2025-09-15 ENCOUNTER — APPOINTMENT (OUTPATIENT)
Dept: HUMAN REPRODUCTION | Facility: CLINIC | Age: 50
End: 2025-09-15
Payer: COMMERCIAL

## 2025-09-15 PROCEDURE — ZZZZZ: CPT
